# Patient Record
Sex: MALE | Race: BLACK OR AFRICAN AMERICAN | Employment: UNEMPLOYED | URBAN - METROPOLITAN AREA
[De-identification: names, ages, dates, MRNs, and addresses within clinical notes are randomized per-mention and may not be internally consistent; named-entity substitution may affect disease eponyms.]

---

## 2017-03-26 ENCOUNTER — HOSPITAL ENCOUNTER (EMERGENCY)
Facility: CLINIC | Age: 22
Discharge: HOME | End: 2017-03-26
Attending: EMERGENCY MEDICINE | Admitting: EMERGENCY MEDICINE

## 2017-03-26 VITALS
SYSTOLIC BLOOD PRESSURE: 126 MMHG | RESPIRATION RATE: 18 BRPM | TEMPERATURE: 98.5 F | OXYGEN SATURATION: 97 % | DIASTOLIC BLOOD PRESSURE: 78 MMHG

## 2017-03-26 DIAGNOSIS — K02.9 DENTAL CARIES: ICD-10-CM

## 2017-03-26 DIAGNOSIS — K08.89 PAIN, DENTAL: ICD-10-CM

## 2017-03-26 PROCEDURE — 99282 EMERGENCY DEPT VISIT SF MDM: CPT | Mod: Z6 | Performed by: EMERGENCY MEDICINE

## 2017-03-26 PROCEDURE — 99282 EMERGENCY DEPT VISIT SF MDM: CPT | Performed by: EMERGENCY MEDICINE

## 2017-03-26 ASSESSMENT — ENCOUNTER SYMPTOMS
SHORTNESS OF BREATH: 0
HEADACHES: 0
EYES NEGATIVE: 1
FEVER: 0
ABDOMINAL PAIN: 0

## 2017-03-26 NOTE — ED NOTES
Pt has tooth pain on top left side, wisdom coming in and was taking antibiotics, but stopped 2 days ago.  Pain woke him up from sleep.

## 2017-03-26 NOTE — ED PROVIDER NOTES
History     Chief Complaint   Patient presents with     Dental Pain     wisdom coming in, cracked tooth on top left     HPI  Denita Jiang is a 21 year old -American male who presents with dental pain for 3 days.  The patient states he has pain on both sides of his mouth.  He also feels that his wisdom teeth are coming in at the bottom.  He states the pain is excruciating.  He has a chipped tooth with a hole in it and dental cavities.  He states he was taking Norco that was left over from a broken wrist surgery and it didn't help him.  He states he is allergic to ibuprofen.  The patient has been taking penicillin but states he stopped taking this months ago.  He states he's been using Orajel without help. He states it is difficult to eat or drink anything.  He denies fevers.  Social: Patient is a smoker    I have reviewed the Medications, Allergies, Past Medical and Surgical History, and Social History in the Epic system.    Review of Systems   Constitutional: Negative for fever.   HENT: Positive for dental problem.    Eyes: Negative.    Respiratory: Negative for shortness of breath.    Cardiovascular: Negative for chest pain.   Gastrointestinal: Negative for abdominal pain.   Neurological: Negative for headaches.   All other systems reviewed and are negative.      Physical Exam   BP: 126/78  Heart Rate: 89  Temp: 98.5  F (36.9  C)  Resp: 18  SpO2: 97 %  Physical Exam   HENT:   Mouth/Throat:         Physical Exam   Constitutional:   well nourished, well developed appears uncomfortable  HENT:   Head: Normocephalic and atraumatic.   Eyes: Conjunctivae are normal. Pupils are equal, round, and reactive to light.    pharynx has no erythema or exudate, mucous membranes are moist, floor of mouth is soft, uvula midline,   Neck:   no adenopathy, no bony tenderness  Cardiovascular: regular rate and rhythm without murmurs or gallops  Pulmonary/Chest: Clear to auscultation bilaterally, with no wheezes or retractions.  "No respiratory distress.  GI: Soft with good bowel sounds.  Non-tender, non-distended, with no guarding, no rebound, no peritoneal signs.   Musculoskeletal:  no edema or clubbing   Skin: Skin is warm and dry.  Neurological: alert and oriented to person, place, and time. Nonfocal exam  Psychiatric:  normal mood and affect.   ED Course     ED Course     Procedures             Critical Care time:  none               Labs Ordered and Resulted from Time of ED Arrival Up to the Time of Departure from the ED - No data to display    Assessments & Plan (with Medical Decision Making)       I have reviewed the nursing notes.  Emergency Department course:  The patient was seen and examined at 0739 am.    The patient has poor dentition with multiple caries.  There are no appreciable abscesses and he has no trismus or signs of infection.  He is afebrile.  Chart review shows that the patient has had prior ED visits for dental pain and dental caries.  Review of the \"[place] prescription drug monitoring program\" shows that the patient received 10 tablets of oxycodone from a physician in [place] on March 21, 5 days ago.  I offered the patient a dental block.  I believe he has had one of these in the past.  He states he did not want this because he was worried about the pain recurring later today.  I offered to put a dental paste on one of the open cavities, but the patient did not want this either.    I spoke with dental on call regarding follow-up for tomorrow.  There is a dental clinic open today, Sunday, in [place], [place]--- [facility] at [phone].  The patient states he has no car and no way of getting there and is not able to take a bus.   He also states he has no insurance.  I gave the patient a list of dental clinics with sliding scale fees.  He apparently told the triage nurse that his father had dropped him off in the ED.  I am unclear as to what the patient expected.  He requested that I pull his " teeth, but I explained that we do not do this in the ED.  Quite simply, the patient needs a dentist in follow-up.  He was offered a dental block and dental paste for pain control and he refused both.  I gave him a list of multiple dental clinics in which he can follow-up , and even found a clinic that is open today.  The patient was discharged with instructions to follow up with a dentist as soon as possible.  I have reviewed the findings, diagnosis, plan and need for follow up with the patient.    Discharge Medication List as of 3/26/2017  8:00 AM          Final diagnoses:   Pain, dental   Dental caries       3/26/2017   [facility], [place], EMERGENCY DEPARTMENT  This note was created in part by the use of Dragon voice recognition dictation system. Inadvertent grammatical errors and typographical errors may still exist.  MD Nimesh Oneal Alda L, MD  03/26/17 1126

## 2017-03-26 NOTE — ED AVS SNAPSHOT
[facility], [place], Emergency Department    [address]    [place] [address]    Phone:  [phone]    Fax:  [phone]                                       Denita Jiang   MRN: [id number]    Department:  [facility], Emergency Department   Date of Visit:  3/26/2017           After Visit Summary Signature Page     I have received my discharge instructions, and my questions have been answered. I have discussed any challenges I see with this plan with the nurse or doctor.    ..........................................................................................................................................  Patient/Patient Representative Signature      ..........................................................................................................................................  Patient Representative Print Name and Relationship to Patient    ..................................................               ................................................  Date                                            Time    ..........................................................................................................................................  Reviewed by Signature/Title    ...................................................              ..............................................  Date                                                            Time

## 2017-03-26 NOTE — DISCHARGE INSTRUCTIONS
Please make an appointment to follow up with [facility] (phone: [phone]) in 1 days.   There is a dental emergency clinic in [facility] the phone number is [phone] and they are open today.  It is called [facility].  Many of these clinics offer a sliding fee option for patients that qualify, and see patients on a walk-in or same day basis. Please call each clinic directly. As services, hours, fees and policies vary greatly.    [place]:  [facility]     [phone]  [facility] ([facility]) [phone]  [facility]  [phone]  [facility]      [phone]   Community Clinic    [phone]  [facility]  [phone]  [facility] [phone]  [facility]     [phone]  [facility]   [phone]  [facility] (cash only)   [phone]  [facility]    [phone] (adults)          [phone] (children)    [place]:  [facility]     [phone]; [phone]  [facility]     [phone]  [facility]     [phone]  [facility] (free, limited)    [phone]    Multiple Locations:  [facility]       [phone]

## 2017-03-26 NOTE — ED AVS SNAPSHOT
[facility], [place], Emergency Department    [address]    [place] [address]    Phone:  [phone]    Fax:  [phone]                                       Denita Jiang   MRN: [id number]    Department:  [facility], [place], Emergency Department   Date of Visit:  3/26/2017           Patient Information     Date Of Birth          1995        Your diagnoses for this visit were:     Pain, dental     Dental caries        You were seen by Lucrecia Beavers MD.        Discharge Instructions       Please make an appointment to follow up with [facility] (phone: [phone]) in 1 days.   There is a dental emergency clinic in [facility] the phone number is [phone] and they are open today.  It is called [facility].  Many of these clinics offer a sliding fee option for patients that qualify, and see patients on a walk-in or same day basis. Please call each clinic directly. As services, hours, fees and policies vary greatly.    [place]:  [facility]     [phone]  [facility] ([facility]) [phone]  [facility]  [phone]  [facility]      [phone]   Community Clinic    [phone]  [facility]  [phone]  [facility] [phone]  [facility]     [phone]  [facility]   [phone]  [facility] (cash only)   [phone]  [facility]    [phone] (adults)          [phone] (children)    [place]:  [facility]     [phone]; [phone]  [facility]     [phone]  \[facility]"" Community Clinic     [phone]  [facility] (free, limited)    [phone]    Multiple Locations:  [facility]       [phone]         Discharge References/Attachments     DENTAL CAVITY  "(ENGLISH)    DENTAL PAIN (ENGLISH)      24 Hour Appointment Hotline       To make an appointment at any [facility], call [phone]). If you don't have a family doctor or clinic, we will help you find one. [facility] clinics are conveniently located to serve the needs of you and your family.             Review of your medicines      Our records show that you are taking the medicines listed below. If these are incorrect, please call your family doctor or clinic.        Dose / Directions Last dose taken    HYDROcodone-acetaminophen 5-325 MG per tablet   Commonly known as:  NORCO   Dose:  1-2 tablet   Quantity:  10 tablet        Take 1-2 tablets by mouth every 6 hours as needed   Refills:  0        NO ACTIVE MEDICATIONS        Refills:  0        penicillin V potassium 500 MG tablet   Commonly known as:  VEETID   Dose:  500 mg   Quantity:  30 tablet        Take 1 tablet (500 mg) by mouth 3 times daily   Refills:  0                Orders Needing Specimen Collection     None      Pending Results     No orders found from 3/24/2017 to 3/27/2017.            Pending Culture Results     No orders found from 3/24/2017 to 3/27/2017.            Thank you for choosing [facility]       Thank you for choosing [facility] for your care. Our goal is always to provide you with excellent care. Hearing back from our patients is one way we can continue to improve our services. Please take a few minutes to complete the written survey that you may receive in the mail after you visit with us. Thank you!        [employer] Information     [employer] lets you send messages to your doctor, view your test results, renew your prescriptions, schedule appointments and more. To sign up, go to [email or web address] . Click on \"Log in\" on the left side of the screen, which will take you to the Welcome page. Then click on \"Sign up Now\" on the right side of the page.     You will be asked to enter the access code listed below, as well as " some personal information. Please follow the directions to create your username and password.     Your access code is: [email or web address]  Expires: 2017  8:00 AM     Your access code will  in 90 days. If you need help or a new code, please call your [place] clinic or [phone].        Care EveryWhere ID     This is your Care EveryWhere ID. This could be used by other organizations to access your [place] medical records  [id number]        After Visit Summary       This is your record. Keep this with you and show to your community pharmacist(s) and doctor(s) at your next visit.

## 2017-09-07 ENCOUNTER — HOSPITAL ENCOUNTER (EMERGENCY)
Facility: CLINIC | Age: 22
Discharge: HOME | End: 2017-09-07
Attending: EMERGENCY MEDICINE | Admitting: EMERGENCY MEDICINE
Payer: MEDICAID

## 2017-09-07 VITALS
OXYGEN SATURATION: 98 % | TEMPERATURE: 97.9 F | SYSTOLIC BLOOD PRESSURE: 118 MMHG | HEIGHT: 72 IN | DIASTOLIC BLOOD PRESSURE: 78 MMHG | BODY MASS INDEX: 23.7 KG/M2 | RESPIRATION RATE: 18 BRPM | WEIGHT: 175 LBS

## 2017-09-07 DIAGNOSIS — K04.7 DENTAL INFECTION: ICD-10-CM

## 2017-09-07 PROCEDURE — 64400 NJX AA&/STRD TRIGEMINAL NRV: CPT | Mod: Z6 | Performed by: EMERGENCY MEDICINE

## 2017-09-07 PROCEDURE — 99283 EMERGENCY DEPT VISIT LOW MDM: CPT | Mod: 25 | Performed by: EMERGENCY MEDICINE

## 2017-09-07 PROCEDURE — 64400 NJX AA&/STRD TRIGEMINAL NRV: CPT

## 2017-09-07 PROCEDURE — 99283 EMERGENCY DEPT VISIT LOW MDM: CPT | Mod: 25

## 2017-09-07 RX ORDER — IBUPROFEN 600 MG/1
600 TABLET, FILM COATED ORAL EVERY 6 HOURS PRN
Qty: 20 TABLET | Refills: 0 | Status: SHIPPED | OUTPATIENT
Start: 2017-09-07 | End: 2017-12-12

## 2017-09-07 RX ORDER — IBUPROFEN 600 MG/1
600 TABLET, FILM COATED ORAL ONCE
Status: DISCONTINUED | OUTPATIENT
Start: 2017-09-07 | End: 2017-09-07

## 2017-09-07 RX ORDER — CLINDAMYCIN HCL 150 MG
450 CAPSULE ORAL 3 TIMES DAILY
Qty: 90 CAPSULE | Refills: 0 | Status: SHIPPED | OUTPATIENT
Start: 2017-09-07 | End: 2017-09-17

## 2017-09-07 RX ORDER — BUPIVACAINE HYDROCHLORIDE AND EPINEPHRINE 5; 5 MG/ML; UG/ML
10 INJECTION, SOLUTION EPIDURAL; INTRACAUDAL; PERINEURAL ONCE
Status: DISCONTINUED | OUTPATIENT
Start: 2017-09-07 | End: 2017-09-07 | Stop reason: HOSPADM

## 2017-09-07 ASSESSMENT — ENCOUNTER SYMPTOMS
FEVER: 0
SHORTNESS OF BREATH: 0
SORE THROAT: 0
TROUBLE SWALLOWING: 0

## 2017-09-07 NOTE — DISCHARGE INSTRUCTIONS
Dental Abscess  An abscess is a sac of pus. A dental abscess forms when a tooth or the tissue around it becomes infected with bacteria. The bacteria can enter through a cavity or a crack in a tooth. It can also infect the gum tissue or bone around a tooth. An untreated abscess can cause the loss of the tooth. It can even spread to other parts of the body and become life-threatening.    Symptoms of a dental abscess   Signs of a dental abscess include:    Toothache, often severe    Tooth pain with hot, cold, or pressure    Pain in the gums, cheek, or jaw    Bad breath or bitter taste in the mouth    Trouble swallowing or opening the mouth    Fever    Swollen or enlarged glands in the neck  Diagnosing a dental abscess  An abscess is diagnosed by looking at your teeth and gums. You will be told if any tests, like dental X-rays, are needed.  Treating a dental abscess  Treatments for a dental abscess may include the following:    Antibiotic medicines to treat the underlying infection.    Pain relievers to help you feel more comfortable. Your health care provider may prescribe a medicine for you. Or, use over-the-counter pain relievers, like acetaminophen or ibuprofen.    Warm saltwater rinses to soothe discomfort and help clear away pus.    Root canal surgery if needed to save the tooth. With a root canal, the infected part of the tooth is removed. A special substance is then used to fill the empty space in the tooth.    Drainage of the abscess if needed. Incisions are made to allow the infected material to drain from the tooth.    Removal of the tooth in cases of severe infection that can t be treated another way.  If the infection is severe, has spread, or doesn t respond to treatment, you may need to be admitted to a hospital.        When to call the dentist  Call your dentist right away if you have any of the following:    Fever of 100.4 F (38 C) or higher    Increased pain, redness, drainage, or swelling in the  treated area    Swelling of the face or jawbone    Pain that cannot be controlled with medicines   Preventing dental abscess  To prevent another abscess in the future, keep your teeth clean and healthy. Brush twice a day and floss at least once daily. See your dentist for regular tooth cleanings. And avoid sugary foods and drinks that can lead to tooth decay.  Date Last Reviewed: 7/14/2015 2000-2017 [employer]. [address] [place] [address], [place] [address]. All rights reserved. This information is not intended as a substitute for professional medical care. Always follow your healthcare professional's instructions.      Many of these clinics offer a sliding fee option for patients that qualify, and see patients on a walk-in or same day basis. Please call each clinic directly. As services, hours, fees and policies vary greatly.    [place]:  [facility]     [phone]  [facility] ([facility]) [phone]  [facility]  [phone]  [facility]      [phone]   Community Clinic    [phone]  [place] [facility]  [phone]  [facility] [phone]  [facility]     [phone]  [facility]   [phone]  [facility] (cash only)   [phone]  [facility]    [phone] (adults)          [phone] (children)    [place]:  [facility]     [phone]; [phone]  [facility]     [phone]  [facility]     [phone]  [facility] (free, limited)    [phone]    Multiple Locations:  [facility]       [phone]

## 2017-09-07 NOTE — ED AVS SNAPSHOT
[facility], [place], Emergency Department    [address]    [place] [address]    Phone:  [phone]    Fax:  [phone]                                       Denita Jiang   MRN: [id number]    Department:  [facility], Emergency Department   Date of Visit:  9/7/2017           After Visit Summary Signature Page     I have received my discharge instructions, and my questions have been answered. I have discussed any challenges I see with this plan with the nurse or doctor.    ..........................................................................................................................................  Patient/Patient Representative Signature      ..........................................................................................................................................  Patient Representative Print Name and Relationship to Patient    ..................................................               ................................................  Date                                            Time    ..........................................................................................................................................  Reviewed by Signature/Title    ...................................................              ..............................................  Date                                                            Time

## 2017-09-07 NOTE — ED NOTES
Toothache going for about a month but getting worst tonight. Left side chipped tooth both upper and lower.

## 2017-09-07 NOTE — ED AVS SNAPSHOT
[facility], Emergency Department    [address]    [place] [address]    Phone:  [phone]    Fax:  [phone]                                       Denita Jiang   MRN: [id number]    Department:  [facility], Emergency Department   Date of Visit:  9/7/2017           Patient Information     Date Of Birth          1995        Your diagnoses for this visit were:     Dental infection        You were seen by Courtney King MD.        Discharge Instructions         Dental Abscess  An abscess is a sac of pus. A dental abscess forms when a tooth or the tissue around it becomes infected with bacteria. The bacteria can enter through a cavity or a crack in a tooth. It can also infect the gum tissue or bone around a tooth. An untreated abscess can cause the loss of the tooth. It can even spread to other parts of the body and become life-threatening.    Symptoms of a dental abscess   Signs of a dental abscess include:    Toothache, often severe    Tooth pain with hot, cold, or pressure    Pain in the gums, cheek, or jaw    Bad breath or bitter taste in the mouth    Trouble swallowing or opening the mouth    Fever    Swollen or enlarged glands in the neck  Diagnosing a dental abscess  An abscess is diagnosed by looking at your teeth and gums. You will be told if any tests, like dental X-rays, are needed.  Treating a dental abscess  Treatments for a dental abscess may include the following:    Antibiotic medicines to treat the underlying infection.    Pain relievers to help you feel more comfortable. Your health care provider may prescribe a medicine for you. Or, use over-the-counter pain relievers, like acetaminophen or ibuprofen.    Warm saltwater rinses to soothe discomfort and help clear away pus.    Root canal surgery if needed to save the tooth. With a root canal, the infected part of the tooth is removed. A special substance is then used to fill the empty space in the tooth.    Drainage of  the abscess if needed. Incisions are made to allow the infected material to drain from the tooth.    Removal of the tooth in cases of severe infection that can t be treated another way.  If the infection is severe, has spread, or doesn t respond to treatment, you may need to be admitted to a hospital.        When to call the dentist  Call your dentist right away if you have any of the following:    Fever of 100.4 F (38 C) or higher    Increased pain, redness, drainage, or swelling in the treated area    Swelling of the face or jawbone    Pain that cannot be controlled with medicines   Preventing dental abscess  To prevent another abscess in the future, keep your teeth clean and healthy. Brush twice a day and floss at least once daily. See your dentist for regular tooth cleanings. And avoid sugary foods and drinks that can lead to tooth decay.  Date Last Reviewed: 7/14/2015 2000-2017 [employer]. [address] [place] [address]. All rights reserved. This information is not intended as a substitute for professional medical care. Always follow your healthcare professional's instructions.      Many of these clinics offer a sliding fee option for patients that qualify, and see patients on a walk-in or same day basis. Please call each clinic directly. As services, hours, fees and policies vary greatly.    [place]:  [facility]     [phone]  [facility] ([facility]) [phone]  [facility]  [phone]  [facility]      [phone]   Community [facility]    [phone]  [facility]  [phone]  [facility] [phone]  [facility]     [phone]  [facility]   [phone]  [facility] (cash only)   [phone]  [facility]    [phone]  (adults)          [phone] (children)    [place]:  [facility]     [phone]; [phone]  [facility]     [phone]  [facility]     [phone]  [facility] (free, limited)    [phone]    Multiple Locations:  [facility]       [phone]                  24 Hour Appointment Hotline       To make an appointment at any [facility], call [phone]). If you don't have a family doctor or clinic, we will help you find one. [facility] [place] clinics are conveniently located to serve the needs of you and your family.             Review of your medicines      START taking        Dose / Directions Last dose taken    clindamycin 150 MG capsule   Commonly known as:  CLEOCIN   Dose:  450 mg   Quantity:  90 capsule        Take 3 capsules (450 mg) by mouth 3 times daily for 10 days   Refills:  0        ibuprofen 600 MG tablet   Commonly known as:  ADVIL/MOTRIN   Dose:  600 mg   Quantity:  20 tablet        Take 1 tablet (600 mg) by mouth every 6 hours as needed for moderate pain   Refills:  0          Our records show that you are taking the medicines listed below. If these are incorrect, please call your family doctor or clinic.        Dose / Directions Last dose taken    HYDROcodone-acetaminophen 5-325 MG per tablet   Commonly known as:  NORCO   Dose:  1-2 tablet   Quantity:  10 tablet        Take 1-2 tablets by mouth every 6 hours as needed   Refills:  0        NO ACTIVE MEDICATIONS        Refills:  0          STOP taking        Dose Reason for stopping Comments    penicillin V potassium 500 MG tablet   Commonly known as:  VEETID                      Prescriptions were sent or printed at these locations (2 Prescriptions)                   Other Prescriptions                Printed at Department/Unit printer (2 of 2)         clindamycin (CLEOCIN) 150 MG capsule               ibuprofen (ADVIL/MOTRIN) 600 MG tablet                Orders Needing  "Specimen Collection     None      Pending Results     No orders found from 2017 to 2017.            Pending Culture Results     No orders found from 2017 to 2017.            Pending Results Instructions     If you had any lab results that were not finalized at the time of your Discharge, you can call the ED Lab Result RN at [phone]. You will be contacted by this team for any positive Lab results or changes in treatment. The nurses are available 7 days a week from 10A to 6:30P.  You can leave a message 24 hours per day and they will return your call.        Thank you for choosing [facility]       Thank you for choosing [facility] for your care. Our goal is always to provide you with excellent care. Hearing back from our patients is one way we can continue to improve our services. Please take a few minutes to complete the written survey that you may receive in the mail after you visit with us. Thank you!        [facility] Information     [employer] lets you send messages to your doctor, view your test results, renew your prescriptions, schedule appointments and more. To sign up, go to [email or web address] . Click on \"Log in\" on the left side of the screen, which will take you to the Welcome page. Then click on \"Sign up Now\" on the right side of the page.     You will be asked to enter the access code listed below, as well as some personal information. Please follow the directions to create your username and password.     Your access code is: [email or web address]  Expires: 2017  1:35 AM     Your access code will  in 90 days. If you need help or a new code, please call your [facility] clinic or [phone].        Care EveryWhere ID     This is your Care EveryWhere ID. This could be used by other organizations to access your [facility] medical records  [id number]        Equal Access to Services     MALIK BERKOWITZ AH: Lindsey Morris, duane brizuela, tyler dewitt " tamiok cannon. So [facility] [phone].    ATENCIÓN: Si habla español, tiene a luis disposición servicios gratuitos de asistencia lingüística. Llame al [phone].    We comply with applicable federal civil rights laws and [place] laws. We do not discriminate on the basis of race, color, national origin, age, disability sex, sexual orientation or gender identity.            After Visit Summary       This is your record. Keep this with you and show to your community pharmacist(s) and doctor(s) at your next visit.

## 2017-09-07 NOTE — ED PROVIDER NOTES
History     Chief Complaint   Patient presents with     Dental Pain     left side upper and lower chipped tooth. started more than a month ago and getting worst now. took some pain med today but its hurting really bad     HPI  Denita Jiang is a 22 year old male who arrives to the Emergency Department today with complaint of dental pain for 1 month, increased over the last few days.  He has chronic poor dentition.  He states it has been at least 6 months since he seen a dentist.  No fever, sore throat, trouble swallowing, shortness of breath, or any other complaints.  He states he does not have dental insurance and is not sure where to see a dentist.      This part of the medical record was transcribed by Arlette Espinal Scribinna, from a dictation done by Courtney King MD.     PAST MEDICAL HISTORY  Past Medical History:   Diagnosis Date     ADHD (attention deficit hyperactivity disorder)      PAST SURGICAL HISTORY  History reviewed. No pertinent surgical history.  FAMILY HISTORY  No family history on file.  SOCIAL HISTORY  Social History   Substance Use Topics     Smoking status: Current Some Day Smoker     Packs/day: 0.25     Smokeless tobacco: Never Used     Alcohol use No      Comment: none in 3 months     MEDICATIONS  Current Facility-Administered Medications   Medication     bupivacaine 0.5% - EPINEPHrine 1:200,000 (PF) 0.5% -1:966315 injection 10 mL     Current Outpatient Prescriptions   Medication     clindamycin (CLEOCIN) 150 MG capsule     ibuprofen (ADVIL/MOTRIN) 600 MG tablet     HYDROcodone-acetaminophen (NORCO) 5-325 MG per tablet     NO ACTIVE MEDICATIONS     ALLERGIES  Allergies   Allergen Reactions     Ibuprofen [Jessica Ibuprofen]      Penicillin G          I have reviewed the Medications, Allergies, Past Medical and Surgical History, and Social History in the Epic system.    Review of Systems   Constitutional: Negative for fever.   HENT: Positive for dental problem (left upper and lower  chipped teeth). Negative for sore throat and trouble swallowing.    Respiratory: Negative for shortness of breath.    All other systems reviewed and are negative.      Physical Exam   BP: (!) 122/100  Heart Rate: 105  Temp: 98.3  F (36.8  C)  Resp: 18  Height: 182.9 cm (6')  Weight: 79.4 kg (175 lb)  SpO2: 96 %  Physical Exam   Constitutional: He appears distressed (appears uncomfortable).   HENT:   Head: Atraumatic.   Mouth/Throat: No oropharyngeal exudate.       No trismus. Floor of mouth soft   Eyes: Pupils are equal, round, and reactive to light. No scleral icterus.   Cardiovascular: Normal heart sounds and intact distal pulses.    Pulmonary/Chest: Breath sounds normal. No respiratory distress.   Abdominal: Soft. Bowel sounds are normal. There is no tenderness.   Musculoskeletal: He exhibits no edema or tenderness.   Skin: Skin is warm. No rash noted. He is not diaphoretic.       ED Course     ED Course     Procedures             Critical Care time:  none   Superior/inferior alveolar dental block performed on the left with 0.5% bupivicaine with epi, total of 6 cc injected with good anesthesia acheived          Labs Ordered and Resulted from Time of ED Arrival Up to the Time of Departure from the ED - No data to display         Assessments & Plan (with Medical Decision Making)   The patient does have to significantly decayed teeth.  No gingival erythema or swelling.  He does have tenderness percussion of both of those teeth.  I did offer dental block. He did agree.  Both superior and inferior alveolar nerve blocks were performed with good anesthesia.  He will be given clindamycin for presumed dental infection.  He told me that he can take ibuprofen, but it doesn t usually work well for him.  It s listed in his allergies his chart,  but again he tells me that he does take this at home at times.  He was prescribed ibuprofen as well and given a list of low-cost dentists.  I did stress to him that this is going to  continue to happen until he gets these teeth fixed.  He verbalizes understanding and tells me he will go ahead and see a dentist.      This part of the medical record was transcribed by Rory Leo, Medical Scribe, from a dictation done by Courtney King MD.       I have reviewed the nursing notes.    I have reviewed the findings, diagnosis, plan and need for follow up with the patient.    New Prescriptions    CLINDAMYCIN (CLEOCIN) 150 MG CAPSULE    Take 3 capsules (450 mg) by mouth 3 times daily for 10 days    IBUPROFEN (ADVIL/MOTRIN) 600 MG TABLET    Take 1 tablet (600 mg) by mouth every 6 hours as needed for moderate pain       Final diagnoses:   Dental infection       9/7/2017   [facility], [place], EMERGENCY DEPARTMENT     Courtney King MD  09/07/17 0155

## 2017-12-12 ENCOUNTER — HOSPITAL ENCOUNTER (EMERGENCY)
Facility: CLINIC | Age: 22
Discharge: HOME | End: 2017-12-12
Attending: INTERNAL MEDICINE | Admitting: INTERNAL MEDICINE
Payer: COMMERCIAL

## 2017-12-12 VITALS
RESPIRATION RATE: 16 BRPM | OXYGEN SATURATION: 100 % | SYSTOLIC BLOOD PRESSURE: 122 MMHG | WEIGHT: 165 LBS | DIASTOLIC BLOOD PRESSURE: 99 MMHG | HEART RATE: 56 BPM | BODY MASS INDEX: 22.38 KG/M2 | TEMPERATURE: 97.4 F

## 2017-12-12 DIAGNOSIS — M27.3 DRY SOCKET: ICD-10-CM

## 2017-12-12 DIAGNOSIS — Z76.5 DRUG-SEEKING BEHAVIOR: ICD-10-CM

## 2017-12-12 PROCEDURE — 99282 EMERGENCY DEPT VISIT SF MDM: CPT | Performed by: INTERNAL MEDICINE

## 2017-12-12 PROCEDURE — 99282 EMERGENCY DEPT VISIT SF MDM: CPT | Mod: Z6 | Performed by: INTERNAL MEDICINE

## 2017-12-12 ASSESSMENT — ENCOUNTER SYMPTOMS
FEVER: 0
NECK STIFFNESS: 0
SHORTNESS OF BREATH: 0
COLOR CHANGE: 0
EYE REDNESS: 0
ARTHRALGIAS: 0
DIFFICULTY URINATING: 0
TROUBLE SWALLOWING: 0
HEADACHES: 0
CONFUSION: 0
ABDOMINAL PAIN: 0

## 2017-12-12 NOTE — ED AVS SNAPSHOT
[facility], Emergency Department    [address]    [place] [address]    Phone:  [phone]    Fax:  [phone]                                       Denita Jiang   MRN: [id number]    Department:  [facility], Emergency Department   Date of Visit:  12/12/2017           Patient Information     Date Of Birth          1995        Your diagnoses for this visit were:     Dry socket        You were seen by Keny Murry MD.        Discharge Instructions         Dry Socket    Dry socket occurs after a tooth is removed (extracted). After a tooth is removed, a blood clot forms in the space where the tooth was. This clot protects the underlying bone until the gum has healed. Dry socket occurs when the blood clot dissolves or falls out too soon, exposing the bone and nerves. This may cause severe pain, which can extend to the jaw or other parts of the face and head. Symptoms usually occur 1 to 3 days after surgery. Dry socket is more likely if the extraction was difficult. Infection also makes dry socket more likely. Smoking or taking birth control pills can also increase the risk.  Home care  Medicines: The healthcare provider may prescribe medicine for pain or infection. Follow the healthcare provider s instructions when using these medicines. If you are given medicine for infection, take it exactly as directed. Do not stop taking it until you are told to.  General care    If the socket was packed with gauze, follow the healthcare provider s instructions to care for it. Follow up with your oral surgeon or dentist to have the gauze changed.    Rinse your mouth with saltwater or a prescribed mouthwash a few times a day. This flushes food particles from the socket. You may be given a syringe to help flush the socket. If this is the case, follow the healthcare provider s instructions closely.    Place a cold pack wrapped in a thin towel on your jaw over the sore area for 10 minutes at a time to help  reduce pain and swelling.    Avoid drinking from a straw. This can make the pain worse.    Avoid foods that are hard and may poke the socket. Avoid hot or cold food and drinks until the socket heals.  Follow-up care  Follow up as directed with your oral surgeon or dentist. Often, your surgeon or dentist will pack the socket until healing is complete. Your pain may go away with the treatment given, but only an oral surgeon or dentist can fully evaluate and treat your dry socket.    If a culture was done, you will be notified if the treatment needs to be changed. You can call as directed for the results.    If X-rays were done, they will be reviewed. You will be notified if there is a change in the reading, especially if it affects treatment.  Call 911  Call emergency services right away if any of these occur:    Trouble breathing or swallowing, wheezing    Hoarse voice or trouble speaking    Confused    Extreme drowsiness or trouble awakening    Fainting or loss of consciousness    Rapid heart rate  When to seek medical advice  Call your healthcare provider right away if any of these occur:    Increased swelling and redness of the face    Pain that worsens or spreads to the neck or ear    Fever of 100.4 F (38 C) or higher    Unusual drowsiness, headache or stiff neck, weakness    Pus draining from the tooth socket    Bleeding that is severe or won t stop with pressure on the area.    Unpleasant smell and taste in your mouth  Date Last Reviewed: 7/30/2015 2000-2017 [employer]. [address] [place] [address]. All rights reserved. This information is not intended as a substitute for professional medical care. Always follow your healthcare professional's instructions.      Many of these clinics offer a sliding fee option for patients that qualify, and see patients on a walk-in or same day basis. Please call each clinic directly. As services, hours, fees and policies vary  lea.    [place]:  [facility]     [phone]  [facility] ([facility]) [phone]  [facility]  [phone]  [facility]      [phone]   Community Clinic    [phone]  [facility]  [phone]  [facility] [phone]  [facility]     [phone]  [facility]   [phone]  [facility] (cash only)   [phone]  [facility]    [phone] (adults)          [phone] (children)    [place]:  [facility]     [phone]; [phone]  [facility]     [phone]  [facility]     [phone]  [facility] (free, limited)    [phone]    Multiple Locations:  [facility]       [phone]               24 Hour Appointment Hotline       To make an appointment at any [facility], call [phone] ([phone]). If you don't have a family doctor or clinic, we will help you find one. [place] clinics are conveniently located to serve the needs of you and your family.             Review of your medicines      Our records show that you are taking the medicines listed below. If these are incorrect, please call your family doctor or clinic.        Dose / Directions Last dose taken    HYDROcodone-acetaminophen 5-325 MG per tablet   Commonly known as:  NORCO   Dose:  1-2 tablet   Quantity:  10 tablet        Take 1-2 tablets by mouth every 6 hours as needed   Refills:  0        NO ACTIVE MEDICATIONS        Refills:  0                Orders Needing Specimen Collection     None      Pending Results     No orders found from 12/10/2017 to 12/13/2017.            Pending Culture Results     No orders found from 12/10/2017 to 12/13/2017.            Pending Results Instructions     If you had any lab results  "that were not finalized at the time of your Discharge, you can call the ED Lab Result RN at [phone]. You will be contacted by this team for any positive Lab results or changes in treatment. The nurses are available 7 days a week from 10A to 6:30P.  You can leave a message 24 hours per day and they will return your call.        Thank you for choosing [facility]       Thank you for choosing [facility] for your care. Our goal is always to provide you with excellent care. Hearing back from our patients is one way we can continue to improve our services. Please take a few minutes to complete the written survey that you may receive in the mail after you visit with us. Thank you!        [email or web address] Information     [employer] lets you send messages to your doctor, view your test results, renew your prescriptions, schedule appointments and more. To sign up, go to [email or web address] . Click on \"Log in\" on the left side of the screen, which will take you to the Welcome page. Then click on \"Sign up Now\" on the right side of the page.     You will be asked to enter the access code listed below, as well as some personal information. Please follow the directions to create your username and password.     Your access code is: [email or web address]  Expires: 3/12/2018  3:28 PM     Your access code will  in 90 days. If you need help or a new code, please call your [facility] clinic or [phone].        Care EveryWhere ID     This is your Care EveryWhere ID. This could be used by other organizations to access your [facility] medical records  [id number]        Equal Access to Services     JENNI BERKOWITZ : Hadii que Morris, walennieda luxinadaha, qaybta kaaltyler murphy . So [facility] [phone].    ATENCIÓN: Si habla español, tiene a luis disposición servicios gratuitos de asistencia lingüística. Llame al [phone].    We comply with applicable federal civil rights laws and [place] laws. We " do not discriminate on the basis of race, color, national origin, age, disability, sex, sexual orientation, or gender identity.            After Visit Summary       This is your record. Keep this with you and show to your community pharmacist(s) and doctor(s) at your next visit.

## 2017-12-12 NOTE — ED NOTES
Pt demanded to see MD re: Narcotic Rx. MD went in and talked to the pt that he is not Rx any Narcotics. MD showed the pt the list of how many times pt was Rx narcotics by other MD's and MD explained that he does not feel comfortable giving anymore Rx for narcotics.

## 2017-12-12 NOTE — ED PROVIDER NOTES
History     Chief Complaint   Patient presents with     Dental Pain     Pain after right lower tooth extraction not by a dentist.  States his whole rlower dental is painful.     QUIN Jiang is a 22 year old male who presents to the ED for the evaluation or right lower dental pain. The patient had his wisdom teeth extracted 4 days ago and has since developed significant pain in the area. He was prescribed Norco following the procedure, but this has been ineffective in controlling his pain. He denies any fevers, significant change in swelling, or hemorrhage from the wound. The patient is not anti-coagulated.  His last dose of Morgan was at 0900 this morning. The patient offers no other concerns or complaints at this time.    PAST MEDICAL HISTORY:   Past Medical History:   Diagnosis Date     ADHD (attention deficit hyperactivity disorder)        PAST SURGICAL HISTORY: History reviewed. No pertinent surgical history.    FAMILY HISTORY: No family history on file.    SOCIAL HISTORY:   Social History   Substance Use Topics     Smoking status: Former Smoker     Packs/day: 0.00     Smokeless tobacco: Never Used     Alcohol use No      Comment: none in 3 months       Patient's Medications   New Prescriptions    No medications on file   Previous Medications    HYDROCODONE-ACETAMINOPHEN (NORCO) 5-325 MG PER TABLET    Take 1-2 tablets by mouth every 6 hours as needed    NO ACTIVE MEDICATIONS       Modified Medications    No medications on file   Discontinued Medications    IBUPROFEN (ADVIL/MOTRIN) 600 MG TABLET    Take 1 tablet (600 mg) by mouth every 6 hours as needed for moderate pain          Allergies   Allergen Reactions     Ibuprofen [Jessica Ibuprofen]      Penicillin G          I have reviewed the Medications, Allergies, Past Medical and Surgical History, and Social History in the Epic system.    Review of Systems   Constitutional: Negative for fever.   HENT: Positive for dental problem. Negative for congestion,  drooling and trouble swallowing.    Eyes: Negative for redness.   Respiratory: Negative for shortness of breath.    Cardiovascular: Negative for chest pain.   Gastrointestinal: Negative for abdominal pain.   Genitourinary: Negative for difficulty urinating.   Musculoskeletal: Negative for arthralgias and neck stiffness.   Skin: Negative for color change.   Neurological: Negative for headaches.   Psychiatric/Behavioral: Negative for confusion.   All other systems reviewed and are negative.      Physical Exam   BP: (!) 122/99  Pulse: 56  Temp: 97.4  F (36.3  C)  Resp: 16  Weight: 74.8 kg (165 lb)  SpO2: 100 %      Physical Exam   Constitutional: No distress.   HENT:   Head: Atraumatic.   Mouth/Throat: Oropharynx is clear and moist. He does not have dentures. No oral lesions. No trismus in the jaw. Normal dentition. No dental abscesses, uvula swelling, lacerations or dental caries. No oropharyngeal exudate.       Eyes: Pupils are equal, round, and reactive to light. No scleral icterus.   Cardiovascular: Normal heart sounds and intact distal pulses.    Pulmonary/Chest: Breath sounds normal. No respiratory distress.   Abdominal: Soft. Bowel sounds are normal. There is no tenderness.   Musculoskeletal: He exhibits no edema or tenderness.   Skin: Skin is warm. No rash noted. He is not diaphoretic.       ED Course     ED Course     Procedures               Labs Ordered and Resulted from Time of ED Arrival Up to the Time of Departure from the ED - No data to display         Assessments & Plan (with Medical Decision Making)  Dry socket, initially reluctant to get Eugenol pecking per Dental Resident then agreed to do it. Then, asking for Narcotic pain meds without Tylenol, since he is allergic to Ibuprofen and Tylenol, Old record indicated he stated he takes ibuprofen without any problem per Dr Ambrose's note, [place]  also reviewed multiple multiple narcotic Rx noted very recently, very suspicious for Drug seeking behavior,  follow up with Dentist.       I have reviewed the nursing notes.    I have reviewed the findings, diagnosis, plan and need for follow up with the patient.    New Prescriptions    No medications on file       Final diagnoses:   Dry socket   Drug-seeking behavior   I, Elbert Malik, am serving as a trained medical scribe to document services personally performed by Keny Murry MD, based on the provider's statements to me.      IKeny MD, was physically present and have reviewed and verified the accuracy of this note documented by Elbert Malik.       12/12/2017   [facility], [place], EMERGENCY DEPARTMENT     Keny Murry MD  12/12/17 1549

## 2017-12-12 NOTE — ED AVS SNAPSHOT
[facility], [place], Emergency Department    [address]    [facility] [place] [address]    Phone:  [phone]    Fax:  [phone]                                       Denita Jiang   MRN: [id number]    Department:  [facility], Emergency Department   Date of Visit:  12/12/2017           After Visit Summary Signature Page     I have received my discharge instructions, and my questions have been answered. I have discussed any challenges I see with this plan with the nurse or doctor.    ..........................................................................................................................................  Patient/Patient Representative Signature      ..........................................................................................................................................  Patient Representative Print Name and Relationship to Patient    ..................................................               ................................................  Date                                            Time    ..........................................................................................................................................  Reviewed by Signature/Title    ...................................................              ..............................................  Date                                                            Time

## 2017-12-12 NOTE — DISCHARGE INSTRUCTIONS
Dry Socket    Dry socket occurs after a tooth is removed (extracted). After a tooth is removed, a blood clot forms in the space where the tooth was. This clot protects the underlying bone until the gum has healed. Dry socket occurs when the blood clot dissolves or falls out too soon, exposing the bone and nerves. This may cause severe pain, which can extend to the jaw or other parts of the face and head. Symptoms usually occur 1 to 3 days after surgery. Dry socket is more likely if the extraction was difficult. Infection also makes dry socket more likely. Smoking or taking birth control pills can also increase the risk.  Home care  Medicines: The healthcare provider may prescribe medicine for pain or infection. Follow the healthcare provider s instructions when using these medicines. If you are given medicine for infection, take it exactly as directed. Do not stop taking it until you are told to.  General care    If the socket was packed with gauze, follow the healthcare provider s instructions to care for it. Follow up with your oral surgeon or dentist to have the gauze changed.    Rinse your mouth with saltwater or a prescribed mouthwash a few times a day. This flushes food particles from the socket. You may be given a syringe to help flush the socket. If this is the case, follow the healthcare provider s instructions closely.    Place a cold pack wrapped in a thin towel on your jaw over the sore area for 10 minutes at a time to help reduce pain and swelling.    Avoid drinking from a straw. This can make the pain worse.    Avoid foods that are hard and may poke the socket. Avoid hot or cold food and drinks until the socket heals.  Follow-up care  Follow up as directed with your oral surgeon or dentist. Often, your surgeon or dentist will pack the socket until healing is complete. Your pain may go away with the treatment given, but only an oral surgeon or dentist can fully evaluate and treat your dry  socket.    If a culture was done, you will be notified if the treatment needs to be changed. You can call as directed for the results.    If X-rays were done, they will be reviewed. You will be notified if there is a change in the reading, especially if it affects treatment.  Call 911  Call emergency services right away if any of these occur:    Trouble breathing or swallowing, wheezing    Hoarse voice or trouble speaking    Confused    Extreme drowsiness or trouble awakening    Fainting or loss of consciousness    Rapid heart rate  When to seek medical advice  Call your healthcare provider right away if any of these occur:    Increased swelling and redness of the face    Pain that worsens or spreads to the neck or ear    Fever of 100.4 F (38 C) or higher    Unusual drowsiness, headache or stiff neck, weakness    Pus draining from the tooth socket    Bleeding that is severe or won t stop with pressure on the area.    Unpleasant smell and taste in your mouth  Date Last Reviewed: 7/30/2015 2000-2017 [employer]. [address] [place] [address]. All rights reserved. This information is not intended as a substitute for professional medical care. Always follow your healthcare professional's instructions.      Many of these clinics offer a sliding fee option for patients that qualify, and see patients on a walk-in or same day basis. Please call each clinic directly. As services, hours, fees and policies vary greatly.    [place]:  [facility]     [phone]  [facility] ([facility]) [phone]  [facility]  [phone]  [facility]      [phone]   [facility]    [phone]  [facility]  [phone]  [facility] ([facility]) [phone]  [facility]     [phone]  [facility]  Services   [phone]  [facility] (cash only)   [phone]  [facility]    [phone] (adults)          [phone] (children)    [place]:  [facility]     [phone]; [phone]  [facility]     [phone]  [facility]     [phone]  [facility] (free, limited)    [phone]    Multiple Locations:  [facility]       [phone]

## 2020-08-20 ENCOUNTER — APPOINTMENT (OUTPATIENT)
Dept: GENERAL RADIOLOGY | Facility: CLINIC | Age: 25
End: 2020-08-20
Attending: EMERGENCY MEDICINE
Payer: COMMERCIAL

## 2020-08-20 ENCOUNTER — HOSPITAL ENCOUNTER (EMERGENCY)
Facility: CLINIC | Age: 25
Discharge: HOME | End: 2020-08-20
Attending: EMERGENCY MEDICINE | Admitting: EMERGENCY MEDICINE
Payer: COMMERCIAL

## 2020-08-20 VITALS
RESPIRATION RATE: 16 BRPM | HEIGHT: 72 IN | HEART RATE: 70 BPM | DIASTOLIC BLOOD PRESSURE: 68 MMHG | OXYGEN SATURATION: 98 % | SYSTOLIC BLOOD PRESSURE: 137 MMHG | BODY MASS INDEX: 23.7 KG/M2 | WEIGHT: 175 LBS | TEMPERATURE: 98.3 F

## 2020-08-20 DIAGNOSIS — S40.012A CONTUSION OF LEFT SHOULDER, INITIAL ENCOUNTER: ICD-10-CM

## 2020-08-20 DIAGNOSIS — S83.91XS SPRAIN OF RIGHT KNEE, UNSPECIFIED LIGAMENT, SEQUELA: ICD-10-CM

## 2020-08-20 PROCEDURE — 73030 X-RAY EXAM OF SHOULDER: CPT | Mod: LT

## 2020-08-20 PROCEDURE — 99284 EMERGENCY DEPT VISIT MOD MDM: CPT

## 2020-08-20 PROCEDURE — 73562 X-RAY EXAM OF KNEE 3: CPT | Mod: RT

## 2020-08-20 ASSESSMENT — MIFFLIN-ST. JEOR: SCORE: 1806.79

## 2020-08-20 NOTE — ED PROVIDER NOTES
History     Chief Complaint:  Shoulder Injury and Knee Pain    HPI   Denita Jiang is a 25 year old male with a history of dental pain who presents to the emergency department for evaluation of shoulder injury and knee pain.  Patient states approximately 1 week ago he fell while standing on a chair.  He is complaint of left shoulder and right knee pain since that time.  He states he has been elevating, icing and using previous pain medications that he had.  He denies any head injury or loss of consciousness.  He denies any back pain or neck pain.  Patient states that his knee hurts with any ambulation in his shoulder the same.  No other injuries.  He denies chest pain, shortness of breath, nausea, vomiting, diarrhea, abdominal pain.      Allergies:  Acetaminophen  Ibuprofen [Jessica Ibuprofen]  Penicillin G     Medications:    Norco    Past Medical History:    ADHD    Past Surgical History:    The patient does not have any pertinent past surgical history.    Family History:    No past pertinent family history.    Social History:  Smoking Status: Former  Smokeless Tobacco: Never   Alcohol Use: No  Drug Use: No  Marital Status:  Single      Review of Systems  Musculoskeletal: Right knee pain with motion of the medial aspect, left shoulder with pain with range of motion remainder without pain  Neuro: No headache, head injury, loss of consciousness  Cardiovascular: No chest pain  Respiratory: No shortness of breath with normal work of breathing  Remainder of systems reviewed and negative.    Physical Exam   Vitals:  Patient Vitals for the past 24 hrs:   BP Temp Temp src Pulse Resp SpO2 Height Weight   08/20/20 0708 137/68 98.3  F (36.8  C) Oral 70 16 98 % 1.829 m (6') 79.4 kg (175 lb)       Physical Exam  General: Patient is alert and normal appearing.  HEENT: Head atraumatic    Eyes: pupils equal and reactive. Conjunctiva clear   Nares: patent   Oropharynx: no lesions, uvula midline, no palatal draping, normal voice, no  trismus  Neck: Supple without lymphadenopathy, no meningismus  Chest: Heart regular rate and rhythm.   Lungs: Equal clear to auscultation with no wheeze or rales  Abdomen: Soft, non tender, nondistended, normal bowel sounds  Back: No costovertebral angle tenderness, no midline C, T or L spine tenderness  Neuro: Grossly nonfocal, normal speech, strength equal bilaterally, CN 2-12 intact  Extremities: No deformities, equal radial and DP pulses. No clubbing, cyanosis.  No edema.  Right knee with no erythema, warmth, swelling.  Tenderness along the medial knee and with meniscus testing.  No calf tenderness, warmth, swelling.  Foot is warm and well-perfused.  Compartments are soft.  Shoulder: Tenderness along the proximal bicep.  No bony tenderness.  Full range of motion.  No erythema or warmth.  Still is neurovascularly intact.  Skin: Warm and dry with no rash.       Emergency Department Course   Imaging:  Radiographic findings were communicated with the patient who voiced understanding of the findings.    XR Shoulder Left G/E 3 Views   Final Result   IMPRESSION: Negative exam.      BASIM ROWELL MD      XR Knee Right 3 Views   Final Result   IMPRESSION: Negative exam.      BASIM ROWELL MD        Emergency Department Course:  Past medical records, nursing notes, and vitals reviewed.  0715 I performed an exam of the patient as documented above.       0730The patient was sent for imaging while in the emergency department, results above.     0830 I rechecked the patient and discussed the results of his workup thus far.     Findings and plan explained to the Patient. Patient discharged home with instructions regarding supportive care, medications, and reasons to return. The importance of close follow-up was reviewed. The patient was prescribed knee imobilizer and sling but left ED without discharged paperwork or knee imoblizer    I personally reviewed the laboratory results with the Patient and answered all related  questions prior to discharge.      Impression & Plan        Medical Decision Making:  Denita Jiang is a 25 year old male  who presents for evaluation of the right knee.  His exam findings are noted above, most pertinent is no redness, warmth to knee making septic arthritis and/or crystal disease of joint very unlikely.  Signs and symptoms are consistent with a knee sprain.  A broad differential was also considered including sprain, strain, fracture, tendon rupture, nerve impingement/compromise, referred pain. Supportive outpatient management is indicated.  Rest, ice, and elevation treatment was discussed with the patient.  In addition patient has tenderness of the left proximal biceps of the left shoulder.  No bony tenderness of the left shoulder.  Full range of motion.  No warmth or erythema.  Compartments of his left arm are soft and he is distal neurovascularly intact.  The patients head to toe trauma exam is otherwise negative for serious underlying disease of the head, neck, chest, abdomen, extremities, pelvis.  Close follow-up with patient's primary care physician per discharge precautions. Contusion discharge instructions given for home.       Diagnosis:    ICD-10-CM    1. Sprain of right knee, unspecified ligament, sequela  S83.91XS    2. Contusion of left shoulder, initial encounter  S40.012A        Disposition:  discharged to home    Discharge Medications:  New Prescriptions    No medications on file       8/20/2020    EMERGENCY DEPARTMENT       Kelsy Walker MD  08/20/20 0866

## 2020-08-20 NOTE — ED AVS SNAPSHOT
Emergency Department  [address] [place] [address]  Phone:  [phone]  Fax:  [phone]                                    Denita Jiang   MRN: [id number]    Department:   Emergency Department   Date of Visit:  8/20/2020           After Visit Summary Signature Page    I have received my discharge instructions, and my questions have been answered. I have discussed any challenges I see with this plan with the nurse or doctor.    ..........................................................................................................................................  Patient/Patient Representative Signature      ..........................................................................................................................................  Patient Representative Print Name and Relationship to Patient    ..................................................               ................................................  Date                                   Time    ..........................................................................................................................................  Reviewed by Signature/Title    ...................................................              ..............................................  Date                                               Time          22EPIC Rev 08/18